# Patient Record
Sex: MALE | Race: WHITE | NOT HISPANIC OR LATINO | URBAN - METROPOLITAN AREA
[De-identification: names, ages, dates, MRNs, and addresses within clinical notes are randomized per-mention and may not be internally consistent; named-entity substitution may affect disease eponyms.]

---

## 2017-10-31 ENCOUNTER — IMPORTED ENCOUNTER (OUTPATIENT)
Dept: URBAN - METROPOLITAN AREA CLINIC 50 | Facility: CLINIC | Age: 77
End: 2017-10-31

## 2017-11-10 ENCOUNTER — IMPORTED ENCOUNTER (OUTPATIENT)
Dept: URBAN - METROPOLITAN AREA CLINIC 50 | Facility: CLINIC | Age: 77
End: 2017-11-10

## 2017-11-15 ENCOUNTER — IMPORTED ENCOUNTER (OUTPATIENT)
Dept: URBAN - METROPOLITAN AREA CLINIC 50 | Facility: CLINIC | Age: 77
End: 2017-11-15

## 2017-12-01 ENCOUNTER — IMPORTED ENCOUNTER (OUTPATIENT)
Dept: URBAN - METROPOLITAN AREA CLINIC 50 | Facility: CLINIC | Age: 77
End: 2017-12-01

## 2017-12-08 ENCOUNTER — IMPORTED ENCOUNTER (OUTPATIENT)
Dept: URBAN - METROPOLITAN AREA CLINIC 50 | Facility: CLINIC | Age: 77
End: 2017-12-08

## 2017-12-15 ENCOUNTER — IMPORTED ENCOUNTER (OUTPATIENT)
Dept: URBAN - METROPOLITAN AREA CLINIC 50 | Facility: CLINIC | Age: 77
End: 2017-12-15

## 2017-12-22 ENCOUNTER — IMPORTED ENCOUNTER (OUTPATIENT)
Dept: URBAN - METROPOLITAN AREA CLINIC 50 | Facility: CLINIC | Age: 77
End: 2017-12-22

## 2017-12-22 NOTE — PATIENT DISCUSSION
"""S/P IOL OD: [employer] [id number] 16.0 (ORA) @ 12Âº (ORA) (Target: -0.6) +ORA/Femto +TM/Omidria. Continue post operative instructions and drops per schedule.  """

## 2017-12-29 ENCOUNTER — IMPORTED ENCOUNTER (OUTPATIENT)
Dept: URBAN - METROPOLITAN AREA CLINIC 50 | Facility: CLINIC | Age: 77
End: 2017-12-29

## 2017-12-29 NOTE — PATIENT DISCUSSION
"""S/P IOL OS: [employer] [id number] 16.5 (ORA) @ 160Âº (Target: [place]) +ORA/Femto/Arcs +TM/Omidria. Continue post operative instructions and drops per schedule.  """

## 2018-01-05 ENCOUNTER — IMPORTED ENCOUNTER (OUTPATIENT)
Dept: URBAN - METROPOLITAN AREA CLINIC 50 | Facility: CLINIC | Age: 78
End: 2018-01-05

## 2018-01-05 NOTE — PATIENT DISCUSSION
"""S/P IOL OS: [employer] [id number] 16.5 (ORA) @ 160Âº (Target: [place]) +ORA/Femto/Arcs +TM/Omidria. Continue post operative instructions and drops per schedule.  """

## 2018-01-18 ENCOUNTER — IMPORTED ENCOUNTER (OUTPATIENT)
Dept: URBAN - METROPOLITAN AREA CLINIC 50 | Facility: CLINIC | Age: 78
End: 2018-01-18

## 2018-01-26 ENCOUNTER — IMPORTED ENCOUNTER (OUTPATIENT)
Dept: URBAN - METROPOLITAN AREA CLINIC 50 | Facility: CLINIC | Age: 78
End: 2018-01-26

## 2018-01-26 NOTE — PATIENT DISCUSSION
"""Start Warm compresses both eyes once a day. 8-10 minutes daily with massaging of the lids"" ""Continue Preservative free tears both eyes two - four times a day.  """

## 2018-01-26 NOTE — PATIENT DISCUSSION
"""S/P IOL OU: OD: [employer] [id number] 16.0 (ORA) @ 12Âº (ORA) (Target: -0.6) +ORAFemto +TMOmidria. OS: [employer] [id number] 16.5 (ORA) @ 160Âº (Target: [place]) +ORA/Arcs +TM. "

## 2018-04-27 ENCOUNTER — IMPORTED ENCOUNTER (OUTPATIENT)
Dept: URBAN - METROPOLITAN AREA CLINIC 50 | Facility: CLINIC | Age: 78
End: 2018-04-27

## 2018-06-20 ENCOUNTER — IMPORTED ENCOUNTER (OUTPATIENT)
Dept: URBAN - METROPOLITAN AREA CLINIC 50 | Facility: CLINIC | Age: 78
End: 2018-06-20

## 2019-05-08 ENCOUNTER — IMPORTED ENCOUNTER (OUTPATIENT)
Dept: URBAN - METROPOLITAN AREA CLINIC 50 | Facility: CLINIC | Age: 79
End: 2019-05-08

## 2019-12-09 ENCOUNTER — IMPORTED ENCOUNTER (OUTPATIENT)
Dept: URBAN - METROPOLITAN AREA CLINIC 50 | Facility: CLINIC | Age: 79
End: 2019-12-09

## 2020-06-15 ENCOUNTER — IMPORTED ENCOUNTER (OUTPATIENT)
Dept: URBAN - METROPOLITAN AREA CLINIC 50 | Facility: CLINIC | Age: 80
End: 2020-06-15

## 2020-06-17 ENCOUNTER — IMPORTED ENCOUNTER (OUTPATIENT)
Dept: URBAN - METROPOLITAN AREA CLINIC 50 | Facility: CLINIC | Age: 80
End: 2020-06-17

## 2020-06-25 ENCOUNTER — IMPORTED ENCOUNTER (OUTPATIENT)
Dept: URBAN - METROPOLITAN AREA CLINIC 50 | Facility: CLINIC | Age: 80
End: 2020-06-25

## 2020-07-13 ENCOUNTER — IMPORTED ENCOUNTER (OUTPATIENT)
Dept: URBAN - METROPOLITAN AREA CLINIC 50 | Facility: CLINIC | Age: 80
End: 2020-07-13

## 2020-07-13 NOTE — PATIENT DISCUSSION
"""No tx necessary at this time.  Will continue to monitor"""
LOV: 10/24/18    NOV: NONE     DOSE:     Frequency:     Pharmacy as Pended:     Per LOV Note:     Per Last PHONE NOTE:     Lab Results   Component Value Date    LABA1C 13.6 10/24/2018
WDL

## 2020-07-27 ENCOUNTER — IMPORTED ENCOUNTER (OUTPATIENT)
Dept: URBAN - METROPOLITAN AREA CLINIC 50 | Facility: CLINIC | Age: 80
End: 2020-07-27

## 2021-04-18 ASSESSMENT — VISUAL ACUITY
OS_SC: 20/25
OS_PH: 20/50
OD_CC: J1+@ 12 IN
OS_SC: 20/30+
OD_SC: 20/30
OD_SC: 20/40
OD_SC: 20/60
OD_SC: 20/60+-
OD_OTHER: 20/80.
OD_CC: J1
OD_BAT: 20/80
OS_PH: @ 18 IN
OS_CC: J1
OS_BAT: 20/25
OD_BAT: 20/60
OS_CC: J1
OD_SC: 20/40
OD_CC: J1+@ 12 IN
OS_BAT: 20/60
OD_PH: 20/30
OS_OTHER: 20/60. 20/80.
OD_OTHER: 20/25. 20/30.
OD_BAT: 20/80
OD_OTHER: 20/60. 20/80.
OS_CC: 20/30
OS_OTHER: 20/60. 20/80.
OS_OTHER: 20/25. 20/25.
OD_CC: 20/30-
OD_SC: 20/40
OS_CC: J1+@ 12 IN
OS_BAT: 20/60
OS_BAT: 20/60
OD_PH: @ 16 IN
OD_BAT: 20/60
OS_CC: J1+@ 16 IN
OD_CC: J2
OS_OTHER: 20/60.
OD_OTHER: 20/60. 20/80.
OS_SC: 20/30+
OD_OTHER: 20/80.
OD_CC: J1
OS_SC: 20/25
OD_CC: J1
OS_PH: 20/30
OD_CC: 20/30-
OS_SC: 20/25
OS_CC: J1
OS_SC: 20/30
OS_CC: J1+@ 12 IN
OS_BAT: 20/60
OS_OTHER: 20/60.
OS_SC: 20/50
OD_BAT: 20/25
OS_CC: J2
OS_CC: 20/30
OD_PH: 20/30-1
OD_CC: J1+@ 16 IN
OD_PH: 20/30
OS_CC: 20/50+
OD_SC: 20/30
OD_SC: 20/30
OS_BAT: 20/60
OS_CC: 20/40-
OS_OTHER: 20/60. 20/60.

## 2021-04-18 ASSESSMENT — TONOMETRY
OD_IOP_MMHG: 14
OS_IOP_MMHG: 13
OD_IOP_MMHG: 13
OD_IOP_MMHG: 15
OS_IOP_MMHG: 8
OS_IOP_MMHG: 12
OS_IOP_MMHG: 15
OD_IOP_MMHG: 12
OD_IOP_MMHG: 13
OS_IOP_MMHG: 14
OS_IOP_MMHG: 15
OS_IOP_MMHG: 13
OD_IOP_MMHG: 22
OS_IOP_MMHG: 20
OD_IOP_MMHG: 15
OD_IOP_MMHG: 13
OS_IOP_MMHG: 13
OS_IOP_MMHG: 12
OD_IOP_MMHG: 12
OD_IOP_MMHG: 12
OS_IOP_MMHG: 13

## 2021-04-18 ASSESSMENT — PACHYMETRY
OD_CT_UM: 524
OS_CT_UM: 526

## 2021-07-26 ENCOUNTER — ANNUAL COMPREHENSIVE EXAM (OUTPATIENT)
Dept: URBAN - METROPOLITAN AREA CLINIC 53 | Facility: CLINIC | Age: 81
End: 2021-07-26

## 2021-07-26 DIAGNOSIS — H40.013: ICD-10-CM

## 2021-07-26 DIAGNOSIS — H35.3121: ICD-10-CM

## 2021-07-26 PROCEDURE — 92014 COMPRE OPH EXAM EST PT 1/>: CPT

## 2021-07-26 PROCEDURE — 92133 CPTRZD OPH DX IMG PST SGM ON: CPT

## 2021-07-26 PROCEDURE — 92134 CPTRZ OPH DX IMG PST SGM RTA: CPT

## 2021-07-26 ASSESSMENT — TONOMETRY
OD_IOP_MMHG: 12
OS_IOP_MMHG: 12

## 2021-07-26 ASSESSMENT — VISUAL ACUITY
OU_SC: 20/20
OS_SC: 20/20
OU_SC: J1(-2)
OD_SC: 20/40+-

## 2021-07-26 NOTE — PATIENT DISCUSSION
Based on increased c/d ratio OS>OD. IOP wnl, 12/12. OCT(RNFL) done today stable to last year OD. OCT(RNFL) OS shows increased thinning superiorly, will bring patient back for repeat RNFL and HVF.

## 2021-08-31 ENCOUNTER — DIAGNOSTICS - HVF/OCT (OUTPATIENT)
Dept: URBAN - METROPOLITAN AREA CLINIC 53 | Facility: CLINIC | Age: 81
End: 2021-08-31

## 2021-08-31 DIAGNOSIS — H40.013: ICD-10-CM

## 2021-08-31 PROCEDURE — 92133 CPTRZD OPH DX IMG PST SGM ON: CPT

## 2021-08-31 PROCEDURE — 92083 EXTENDED VISUAL FIELD XM: CPT

## 2021-08-31 NOTE — PATIENT DISCUSSION
Based on increased c/d ratio OS>OD. IOP wnl, 12/12. OCT RNFL/HVF (08/31/2021) stable. Will continue to monitor without treatment.

## 2022-08-02 ENCOUNTER — COMPREHENSIVE EXAM (OUTPATIENT)
Dept: URBAN - METROPOLITAN AREA CLINIC 53 | Facility: CLINIC | Age: 82
End: 2022-08-02

## 2022-08-02 DIAGNOSIS — H40.013: ICD-10-CM

## 2022-08-02 DIAGNOSIS — H31.011: ICD-10-CM

## 2022-08-02 DIAGNOSIS — H43.813: ICD-10-CM

## 2022-08-02 PROCEDURE — 92134 CPTRZ OPH DX IMG PST SGM RTA: CPT

## 2022-08-02 PROCEDURE — 92014 COMPRE OPH EXAM EST PT 1/>: CPT

## 2022-08-02 ASSESSMENT — VISUAL ACUITY
OS_SC: 20/20
OS_GLARE: 20/30
OS_GLARE: 20/30
OD_GLARE: 20/40
OD_GLARE: 20/40
OD_SC: 20/25
OU_SC: 20/20
OD_PH: 20/25

## 2022-08-02 ASSESSMENT — TONOMETRY
OS_IOP_MMHG: 12
OD_IOP_MMHG: 12

## 2022-08-02 NOTE — PATIENT DISCUSSION
Based on increased c/d ratio OS>OD. IOP wnl, 12/12. OCT RNFL/HVF stable. Will repeat HVF/RNFL every 2 years. Will continue to monitor without treatment. Medication was approved see next telephone encounters.

## 2023-08-15 ENCOUNTER — COMPREHENSIVE EXAM (OUTPATIENT)
Dept: URBAN - METROPOLITAN AREA CLINIC 53 | Facility: CLINIC | Age: 83
End: 2023-08-15

## 2023-08-15 DIAGNOSIS — H43.813: ICD-10-CM

## 2023-08-15 DIAGNOSIS — H31.011: ICD-10-CM

## 2023-08-15 DIAGNOSIS — H40.013: ICD-10-CM

## 2023-08-15 DIAGNOSIS — H04.123: ICD-10-CM

## 2023-08-15 DIAGNOSIS — D23.121: ICD-10-CM

## 2023-08-15 PROCEDURE — 99214 OFFICE O/P EST MOD 30 MIN: CPT

## 2023-08-15 ASSESSMENT — VISUAL ACUITY
OS_SC: 20/20-1
OD_SC: 20/60
OU_SC: J1@14"
OD_PH: 20/30

## 2023-08-15 ASSESSMENT — TONOMETRY
OD_IOP_MMHG: 13
OS_IOP_MMHG: 13
OS_IOP_MMHG: 14
OD_IOP_MMHG: 14

## 2024-09-19 ENCOUNTER — TECH ONLY (OUTPATIENT)
Dept: URBAN - METROPOLITAN AREA CLINIC 53 | Facility: CLINIC | Age: 84
End: 2024-09-19

## 2024-09-19 DIAGNOSIS — H40.013: ICD-10-CM

## 2024-09-19 PROCEDURE — 92133 CPTRZD OPH DX IMG PST SGM ON: CPT

## 2024-09-19 PROCEDURE — 92083 EXTENDED VISUAL FIELD XM: CPT

## 2024-10-16 ENCOUNTER — COMPREHENSIVE EXAM (OUTPATIENT)
Dept: URBAN - METROPOLITAN AREA CLINIC 49 | Facility: LOCATION | Age: 84
End: 2024-10-16

## 2024-10-16 DIAGNOSIS — H04.123: ICD-10-CM

## 2024-10-16 DIAGNOSIS — H43.813: ICD-10-CM

## 2024-10-16 DIAGNOSIS — H40.013: ICD-10-CM

## 2024-10-16 DIAGNOSIS — H31.011: ICD-10-CM

## 2024-10-16 DIAGNOSIS — H01.00B: ICD-10-CM

## 2024-10-16 DIAGNOSIS — D23.121: ICD-10-CM

## 2024-10-16 DIAGNOSIS — H01.00A: ICD-10-CM

## 2024-10-16 PROCEDURE — 99214 OFFICE O/P EST MOD 30 MIN: CPT

## 2024-10-16 RX ORDER — POVIDONE 2.5 MG/.5G
SOLUTION, GEL FORMING / DROPS OPHTHALMIC
Start: 2024-10-16

## 2024-10-16 RX ORDER — ERYTHROMYCIN 5 MG/G
OINTMENT OPHTHALMIC EVERY EVENING
Start: 2024-10-16

## 2025-03-25 ENCOUNTER — EMERGENCY VISIT (OUTPATIENT)
Age: 85
End: 2025-03-25

## 2025-03-25 DIAGNOSIS — H52.13: ICD-10-CM

## 2025-03-25 DIAGNOSIS — H31.011: ICD-10-CM

## 2025-03-25 DIAGNOSIS — H40.013: ICD-10-CM

## 2025-03-25 PROCEDURE — 99214 OFFICE O/P EST MOD 30 MIN: CPT

## 2025-03-25 PROCEDURE — 92015 DETERMINE REFRACTIVE STATE: CPT
